# Patient Record
Sex: MALE | Employment: FULL TIME | URBAN - METROPOLITAN AREA
[De-identification: names, ages, dates, MRNs, and addresses within clinical notes are randomized per-mention and may not be internally consistent; named-entity substitution may affect disease eponyms.]

---

## 2025-01-06 ENCOUNTER — APPOINTMENT (OUTPATIENT)
Dept: ORTHOPEDIC SURGERY | Facility: CLINIC | Age: 30
End: 2025-01-06
Payer: COMMERCIAL

## 2025-01-06 ENCOUNTER — HOSPITAL ENCOUNTER (OUTPATIENT)
Dept: RADIOLOGY | Facility: CLINIC | Age: 30
Discharge: HOME | End: 2025-01-06
Payer: COMMERCIAL

## 2025-01-06 DIAGNOSIS — M25.511 RIGHT SHOULDER PAIN, UNSPECIFIED CHRONICITY: Primary | ICD-10-CM

## 2025-01-06 DIAGNOSIS — M25.511 RIGHT SHOULDER PAIN, UNSPECIFIED CHRONICITY: ICD-10-CM

## 2025-01-06 PROCEDURE — 73030 X-RAY EXAM OF SHOULDER: CPT | Mod: RT

## 2025-01-06 PROCEDURE — 73030 X-RAY EXAM OF SHOULDER: CPT | Mod: RIGHT SIDE | Performed by: RADIOLOGY

## 2025-01-06 PROCEDURE — 99204 OFFICE O/P NEW MOD 45 MIN: CPT | Performed by: ORTHOPAEDIC SURGERY

## 2025-01-06 RX ORDER — MELOXICAM 7.5 MG/1
7.5 TABLET ORAL DAILY
Qty: 30 TABLET | Refills: 11 | Status: SHIPPED | OUTPATIENT
Start: 2025-01-06 | End: 2026-01-06

## 2025-01-06 NOTE — PROGRESS NOTES
Subjective   Patient ID: Nilson Horowitz is a 29 y.o. male    Chief Complaint: No chief complaint on file.       Last Surgery: No surgery found  Date of Last Surgery: No surgery found    HPI  Nilson Horowitz is a 29 y.o. right hand dominant male  presenting for right shoulder pain     He reports that he had insidious onset of right shoulder pain. 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 He does note 4-5 months ago where he was pressing down at work and over extended his shoulder. The pain he felt then is similar to the pain he is feeling today. He notes that he had an exacerbation of pain and presented to the ED on 12/28/24. He was given a script for 5 days of pain medication but cannot recall what its name was. He continues to use 800 mg of Tyleol, icy hot as needed for pain control. He denies any numbness or tingling today.     Smoker, half a pack daily    Objective   Patient is a well-developed, well-nourished male  in no acute distress.  Breathes with normal chest rises.  Pupils are round and symmetric today.  Awake, alert, and oriented x3.      Examination of the left shoulder today reveals the skin to be intact. There is no sign of any atrophy, lesions, or abrasions. There is no pain to palpation of the bony prominences. Cervical lymphadenopathy examined, and this was negative. Patient had 5 out of 5 wrist flexion, extension, and thumb extension bilaterally. Sensation was intact to light touch to median, ulnar, radial axillary, and musculocutaneous nerves bilaterally. Positive radial pulse bilaterally. Provocative maneuvers on the left side today were negative.  Range of motion of the left shoulder revealed 0-170° of forward elevation, 0-60° of external rotation, and internal rotation was to T-12.     Examination of the right shoulder today reveals the skin to be intact. There is no sign of any atrophy, lesions, or abrasions. There is no pain to palpation of the bony  prominences. Cervical lymphadenopathy examined, and this was negative. Patient had 5 out of 5 wrist flexion, extension, and thumb extension, bilaterally. Sensation was intact to light touch to median, ulnar, radial, axillary, and musculocutaneous nerves bilaterally. Positive radial pulse bilaterally. Provocative maneuvers on the right side today were negative except for below.  Range of motion of the right shoulder revealed 0-170° of forward elevation, 0-60° of external rotation, and internal rotation up to T-12. +Ebenezer's. 5/5 strength testing throughout, mild crepitus with jerk and obriens but didn't reproduce kamari    Slightly hyperextends bilateral elbows    Imaging:  X-rays of the RIGHT shoulder taken today personally ordered and interpreted by me show no signs of any fracture, dislocation, arthritis or proximal humerus migration. Some AC joint arthritis    Assessment/Plan   No diagnosis found.  Patient with right shoulder pain possibly as a direct result of a work related injury in the last few months.     We had a long discussion regarding his diagnosis. We talked about his treatment options. We are going to start him on Meloxicam and physical therapy. If therapy does not help, his next step would be to obtain further imaging. He was provided a physical therapy script and our at home exercises. We will see him back in 6 weeks if he is doing no better.     No orders of the defined types were placed in this encounter.        Follow up in 6 weeks    Scribe Attestation  By signing my name below, Kandace REDDY Scribe   attest that this documentation has been prepared under the direction and in the presence of Trever Meyers MD.